# Patient Record
Sex: MALE | Race: WHITE | Employment: OTHER | URBAN - METROPOLITAN AREA
[De-identification: names, ages, dates, MRNs, and addresses within clinical notes are randomized per-mention and may not be internally consistent; named-entity substitution may affect disease eponyms.]

---

## 2019-12-13 ENCOUNTER — HOSPITAL ENCOUNTER (OUTPATIENT)
Age: 64
Discharge: HOME | End: 2019-12-15
Payer: COMMERCIAL

## 2019-12-13 ENCOUNTER — HOSPITAL ENCOUNTER (OUTPATIENT)
Dept: GENERAL RADIOLOGY | Age: 64
Discharge: HOME | End: 2019-12-15
Payer: COMMERCIAL

## 2019-12-13 DIAGNOSIS — S60.021A CONTUSION OF RIGHT INDEX FINGER WITHOUT DAMAGE TO NAIL, INITIAL ENCOUNTER: ICD-10-CM

## 2019-12-13 PROCEDURE — 73130 X-RAY EXAM OF HAND: CPT

## 2020-10-30 ENCOUNTER — HOSPITAL ENCOUNTER (OUTPATIENT)
Age: 65
Discharge: HOME | End: 2020-11-01

## 2020-10-30 PROCEDURE — 88305 TISSUE EXAM BY PATHOLOGIST: CPT

## 2020-10-30 PROCEDURE — 88342 IMHCHEM/IMCYTCHM 1ST ANTB: CPT

## 2020-11-30 ENCOUNTER — HOSPITAL ENCOUNTER (OUTPATIENT)
Age: 65
Discharge: HOME | End: 2020-12-02
Payer: COMMERCIAL

## 2020-11-30 ENCOUNTER — HOSPITAL ENCOUNTER (OUTPATIENT)
Dept: GENERAL RADIOLOGY | Age: 65
Discharge: HOME | End: 2020-12-02
Payer: COMMERCIAL

## 2020-11-30 PROCEDURE — 73630 X-RAY EXAM OF FOOT: CPT

## 2021-07-14 ENCOUNTER — HOSPITAL ENCOUNTER (OUTPATIENT)
Dept: MRI IMAGING | Age: 66
Discharge: HOME | End: 2021-07-16
Payer: COMMERCIAL

## 2021-07-14 DIAGNOSIS — S86.021A: ICD-10-CM

## 2021-07-14 DIAGNOSIS — M79.604 RIGHT LEG PAIN: ICD-10-CM

## 2021-07-14 PROCEDURE — 73721 MRI JNT OF LWR EXTRE W/O DYE: CPT

## 2022-08-18 ENCOUNTER — HOSPITAL ENCOUNTER (OUTPATIENT)
Dept: ULTRASOUND IMAGING | Age: 67
Discharge: HOME | End: 2022-08-20
Payer: MEDICARE

## 2022-08-18 ENCOUNTER — HOSPITAL ENCOUNTER (OUTPATIENT)
Age: 67
Discharge: HOME | End: 2022-08-20
Payer: MEDICARE

## 2022-08-18 DIAGNOSIS — I65.23 BILATERAL CAROTID ARTERY STENOSIS: ICD-10-CM

## 2022-08-18 PROCEDURE — 93880 EXTRACRANIAL BILAT STUDY: CPT

## 2022-09-14 ENCOUNTER — TELEPHONE (OUTPATIENT)
Dept: CARDIOLOGY CLINIC | Age: 67
End: 2022-09-14

## 2022-09-14 NOTE — TELEPHONE ENCOUNTER
Patient Appointment Form:      PCP: Andrade Pizano MD  Referring: Andrade Pizano MD    Has the Patient:    Seen a Cardiologist? no    Had a heart catheterization? no    Had heart surgery? no    Had a stress test or nuclear stress test? yes   date: Over 20yrs ago   facility name:  Can't recall    Had an echocardiogram? no    Had a vascular ultrasound? no    Had a 24/48 heart monitor or extended cardiac event monitor? no    Had recent blood work in the last 6 months? yes    date: 03/30/2022    ordering physician: Andrade Pizano MD    Had a pacemaker/ICD/ILR implant? no    Seen an Electrophysiologist? no        Will send records via: in [address] [place] [address] Rd      Date & time of appointment:09/20/2022 1045A

## 2022-09-20 ENCOUNTER — OFFICE VISIT (OUTPATIENT)
Dept: CARDIOLOGY CLINIC | Age: 67
End: 2022-09-20
Payer: MEDICARE

## 2022-09-20 ENCOUNTER — TELEPHONE (OUTPATIENT)
Dept: CARDIOLOGY CLINIC | Age: 67
End: 2022-09-20

## 2022-09-20 VITALS
RESPIRATION RATE: 12 BRPM | HEIGHT: 71 IN | DIASTOLIC BLOOD PRESSURE: 71 MMHG | BODY MASS INDEX: 30.66 KG/M2 | WEIGHT: 219 LBS | SYSTOLIC BLOOD PRESSURE: 144 MMHG | HEART RATE: 54 BPM

## 2022-09-20 DIAGNOSIS — R00.1 BRADYCARDIA: ICD-10-CM

## 2022-09-20 DIAGNOSIS — I10 HYPERTENSION, UNSPECIFIED TYPE: ICD-10-CM

## 2022-09-20 PROCEDURE — 1123F ACP DISCUSS/DSCN MKR DOCD: CPT | Performed by: INTERNAL MEDICINE

## 2022-09-20 PROCEDURE — 93000 ELECTROCARDIOGRAM COMPLETE: CPT | Performed by: INTERNAL MEDICINE

## 2022-09-20 PROCEDURE — 99204 OFFICE O/P NEW MOD 45 MIN: CPT | Performed by: INTERNAL MEDICINE

## 2022-09-20 RX ORDER — LOSARTAN POTASSIUM 100 MG/1
TABLET ORAL
COMMUNITY
Start: 2022-09-09 | End: 2022-10-10 | Stop reason: SDUPTHER

## 2022-09-20 RX ORDER — LORAZEPAM 0.5 MG/1
0.5 TABLET ORAL EVERY 6 HOURS PRN
COMMUNITY

## 2022-09-20 RX ORDER — ASPIRIN 81 MG/1
81 TABLET, CHEWABLE ORAL DAILY
COMMUNITY

## 2022-09-20 RX ORDER — TRAZODONE HYDROCHLORIDE 50 MG/1
75 TABLET ORAL
COMMUNITY
Start: 2022-07-06

## 2022-09-20 RX ORDER — CITALOPRAM 40 MG/1
TABLET ORAL
COMMUNITY
Start: 2022-08-02

## 2022-09-20 RX ORDER — AMLODIPINE BESYLATE 10 MG/1
TABLET ORAL
COMMUNITY
Start: 2022-08-18

## 2022-09-20 RX ORDER — OMEPRAZOLE 20 MG/1
20 CAPSULE, DELAYED RELEASE ORAL DAILY
COMMUNITY

## 2022-09-20 NOTE — PROGRESS NOTES
Chief Complaint   Patient presents with    Bradycardia       Patient Active Problem List    Diagnosis Date Noted    HTN (hypertension) 09/20/2022    Bradycardia 09/20/2022       Current Outpatient Medications   Medication Sig Dispense Refill    amLODIPine (NORVASC) 10 MG tablet TAKE 1 TABLET BY MOUTH ONCE DAILY      citalopram (CELEXA) 40 MG tablet TAKE 1 TABLET BY MOUTH ONCE DAILY      losartan (COZAAR) 100 MG tablet TAKE 1 TABLET BY MOUTH ONCE DAILY      traZODone (DESYREL) 50 MG tablet 75 mg      aspirin 81 MG chewable tablet Take 81 mg by mouth daily      LORazepam (ATIVAN) 0.5 MG tablet Take 0.5 mg by mouth every 6 hours as needed for Anxiety. Potassium 99 MG TABS Take by mouth      omeprazole (PRILOSEC) 20 MG delayed release capsule Take 20 mg by mouth daily       No current facility-administered medications for this visit. Allergies   Allergen Reactions    Penicillins Anaphylaxis       Vitals:    09/20/22 1046   BP: (!) 144/71   Pulse: 54   Resp: 12   Weight: 219 lb (99.3 kg)   Height: 5' 11\" (1.803 m)                 SUBJECTIVE: Young Guardado presents to the office today for consult - dr Reilly Beverly. He complains of fatigue and denies   dyspnea, exertional chest pressure/discomfort, orthopnea, palpitations, and syncope  Does golf, random walk and all outdoor chores in summer with no issues  On citalopram, trazdone and lorazepam.          Physical Exam   BP (!) 144/71   Pulse 54   Resp 12   Ht 5' 11\" (1.803 m)   Wt 219 lb (99.3 kg)   BMI 30.54 kg/m²   Constitutional: Oriented to person, place, and time. overweightNo distress. Head: Normocephalic and atraumatic. Neck: No hepatojugular reflux and no JVD present. Carotid bruit is not present. Cardiovascular: Normal rate, regular rhythm, normal heart sounds and intact distal pulses. No gallop and no friction rub. No murmur heard. Pulmonary/Chest: Effort normal and breath sounds normal. No respiratory distress. No wheezes. No rales. Abdominal: Soft. Bowel sounds are normal. No distension and no mass. No tenderness. No rebound and no guarding. Musculoskeletal: No edema and no tenderness. Neurological: Alert and oriented to person, place, and time. Skin: Skin is warm and dry. No rash noted. Psychiatric: Normal mood and affect. Behavior is normal.     EKG:  ectopic atrial rhythm, rate 54.     ASSESSMENT AND PLAN:  Patient Active Problem List   Diagnosis    HTN (hypertension)    Bradycardia     Patient with ectopic atrial bradycardia, possibly due to medications ( e.g. celexa)  Normal CV exam and ekg otherwise unremarkable  Fatigue likely due to meds, but will do ETT-R to establish presence of chronotropic competency      Liza Messer M.D  [facility]

## 2022-09-20 NOTE — TELEPHONE ENCOUNTER
Patient states that he can't walk the treadmill, he said he is willing to do a bree scan if you think it is needed

## 2022-10-06 ENCOUNTER — TELEPHONE (OUTPATIENT)
Dept: CARDIOLOGY | Age: 67
End: 2022-10-06

## 2022-10-06 NOTE — TELEPHONE ENCOUNTER
Spoke with patient and confirmed Regular stress test appointment on 10/10/22 at 1130. Instructions for test and COVID-19 preprocedure information reviewed with patient, questions answered. Patient verbalized understanding.

## 2022-10-10 ENCOUNTER — HOSPITAL ENCOUNTER (OUTPATIENT)
Dept: CARDIOLOGY | Age: 67
Discharge: HOME | End: 2022-10-10
Payer: MEDICARE

## 2022-10-10 VITALS
OXYGEN SATURATION: 98 % | SYSTOLIC BLOOD PRESSURE: 142 MMHG | DIASTOLIC BLOOD PRESSURE: 62 MMHG | WEIGHT: 214 LBS | BODY MASS INDEX: 29.96 KG/M2 | HEIGHT: 71 IN | HEART RATE: 52 BPM

## 2022-10-10 DIAGNOSIS — R00.1 BRADYCARDIA: ICD-10-CM

## 2022-10-10 DIAGNOSIS — I10 HYPERTENSION, UNSPECIFIED TYPE: ICD-10-CM

## 2022-10-10 PROCEDURE — 93017 CV STRESS TEST TRACING ONLY: CPT

## 2022-10-10 PROCEDURE — 78452 HT MUSCLE IMAGE SPECT MULT: CPT

## 2022-10-10 RX ORDER — LOSARTAN POTASSIUM 100 MG/1
TABLET ORAL
Qty: 30 TABLET | Refills: 5 | Status: SHIPPED | OUTPATIENT
Start: 2022-10-10

## 2022-10-10 NOTE — PROCEDURES
[address] [facility] [address] [phone]    Exercise Stress Study (non-nuclear)      Name: Pawel Ho Account Number:  [de-identified]      :  1955          Sex: male         Date of Study:  10/10/2022    Height: 5' 11\" (180.3 cm)          Weight: 214 lb (97.1 kg)    Ordering Provider: Hamlet Johnson MD          PCP: Solitario Lau MD    Cardiologist: Hamlet Johnson MD              Interpreting Physician: Hamlet Johnson MD    Indication:   Detecting the presence and location of coronary artery disease    Clinical History:   Patient has no known history of coronary artery disease. Exercise: The patient exercised using a Jhonathan protocol, completing 7:31 minutes and reaching an estimated work load of 42.6 metabolic equivalents (METS). Resting HR was 52. Peak exercise heart rate was 129 ( 83% of maximum predicted heart rate for age). Baseline /62. Peak exercise /118. The blood pressure response to exercise was hypertensive      Exercise was terminated due to fatigue, heart rate attained, and pt. Requested to stop due to shortness of breath difficulty  . The patient experienced no chest pain with exercise. Pulse oximetry was used to monitor oxygen saturation during the stress test.  The study was performed on Room Air. The resting pulse oximeter was 98%. The lowest O2 saturation seen during exercise was 99 %. The average O2 saturation with exercise was 98 %. Exercise ECG:   The patient demonstrated occasional PVC's during exercise. With exercise, there were no ST segment changes of significance at the heart rate achieved. Antoine treadmill score was 7.5 implying low risk. Impression:    Exercise EKG was negative. The patient experienced no chest pain with exercise. Exercise capacity was above average.     Low risk general exercise treadmill test.    Thank you for sending your patient to this [employer].     Electronically signed by Joao Lima MD on 10/10/22 at 1:37 PM EDT

## 2022-10-11 ENCOUNTER — TELEPHONE (OUTPATIENT)
Dept: CARDIOLOGY CLINIC | Age: 67
End: 2022-10-11

## 2022-10-11 NOTE — TELEPHONE ENCOUNTER
----- Message from Shayne Arndt MD sent at 10/10/2022  4:02 PM EDT -----  Stress normal  Ov prn        ----- Message -----  From: Claudean Commander  Sent: 10/10/2022   3:08 PM EDT  To: Shayne Arndt MD      ----- Message -----  From: Curly Saavedra  Sent: 10/10/2022   2:54 PM EDT  To:  Deandre Holbrook

## 2023-10-25 RX ORDER — LOSARTAN POTASSIUM 100 MG/1
TABLET ORAL
Qty: 90 TABLET | Refills: 1 | Status: SHIPPED | OUTPATIENT
Start: 2023-10-25

## 2024-01-02 RX ORDER — LOSARTAN POTASSIUM 100 MG/1
TABLET ORAL
Qty: 90 TABLET | Refills: 1 | Status: SHIPPED | OUTPATIENT
Start: 2024-01-02

## 2024-03-25 RX ORDER — LOSARTAN POTASSIUM 100 MG/1
TABLET ORAL
Qty: 100 TABLET | Refills: 2 | OUTPATIENT
Start: 2024-03-25

## 2024-04-22 RX ORDER — LOSARTAN POTASSIUM 100 MG/1
TABLET ORAL
Qty: 100 TABLET | Refills: 2 | OUTPATIENT
Start: 2024-04-22

## 2024-06-26 RX ORDER — LOSARTAN POTASSIUM 100 MG/1
TABLET ORAL
Qty: 100 TABLET | Refills: 2 | OUTPATIENT
Start: 2024-06-26

## 2024-07-08 RX ORDER — LOSARTAN POTASSIUM 100 MG/1
TABLET ORAL
Qty: 100 TABLET | Refills: 2 | OUTPATIENT
Start: 2024-07-08

## 2024-07-12 RX ORDER — LOSARTAN POTASSIUM 100 MG/1
TABLET ORAL
Qty: 100 TABLET | Refills: 2 | OUTPATIENT
Start: 2024-07-12

## 2024-10-29 RX ORDER — LOSARTAN POTASSIUM 100 MG/1
TABLET ORAL
Qty: 100 TABLET | Refills: 2 | OUTPATIENT
Start: 2024-10-29

## 2024-11-14 ENCOUNTER — HOSPITAL ENCOUNTER (OUTPATIENT)
Age: 69
Discharge: HOME | End: 2024-11-16
Payer: MEDICARE

## 2024-11-14 ENCOUNTER — HOSPITAL ENCOUNTER (OUTPATIENT)
Dept: GENERAL RADIOLOGY | Age: 69
Discharge: HOME | End: 2024-11-16
Attending: FAMILY MEDICINE
Payer: MEDICARE

## 2024-11-14 DIAGNOSIS — R10.84 ABDOMINAL PAIN, GENERALIZED: ICD-10-CM

## 2024-11-14 PROCEDURE — 74018 RADEX ABDOMEN 1 VIEW: CPT

## 2024-11-15 ENCOUNTER — OFFICE VISIT (OUTPATIENT)
Dept: SURGERY | Age: 69
End: 2024-11-15
Payer: MEDICARE

## 2024-11-15 VITALS
DIASTOLIC BLOOD PRESSURE: 75 MMHG | HEART RATE: 58 BPM | WEIGHT: 214 LBS | OXYGEN SATURATION: 98 % | SYSTOLIC BLOOD PRESSURE: 153 MMHG | RESPIRATION RATE: 18 BRPM | BODY MASS INDEX: 29.96 KG/M2 | HEIGHT: 71 IN

## 2024-11-15 DIAGNOSIS — K43.9 VENTRAL HERNIA WITHOUT OBSTRUCTION OR GANGRENE: Primary | ICD-10-CM

## 2024-11-15 PROCEDURE — 99204 OFFICE O/P NEW MOD 45 MIN: CPT | Performed by: SURGERY

## 2024-11-15 PROCEDURE — 3077F SYST BP >= 140 MM HG: CPT | Performed by: SURGERY

## 2024-11-15 PROCEDURE — 3078F DIAST BP <80 MM HG: CPT | Performed by: SURGERY

## 2024-11-15 PROCEDURE — 1123F ACP DISCUSS/DSCN MKR DOCD: CPT | Performed by: SURGERY

## 2024-11-15 PROCEDURE — 1159F MED LIST DOCD IN RCRD: CPT | Performed by: SURGERY

## 2024-11-15 NOTE — PROGRESS NOTES
Vaping Use    Vaping status: Never Used   Substance and Sexual Activity    Alcohol use: Yes       Family History   Problem Relation Age of Onset    Heart Disease Mother     Heart Disease Father        Review of Systems   All other systems reviewed and are negative.              Objective:  Vitals:    11/15/24 0930   BP: (!) 153/75   Pulse: 58   Resp: 18   SpO2: 98%   Weight: 97.1 kg (214 lb)   Height: 1.803 m (5' 11\")        Body mass index is 29.85 kg/m².      Physical Exam  Constitutional:       General: He is not in acute distress.     Appearance: He is not diaphoretic.   HENT:      Head: Normocephalic and atraumatic.   Eyes:      General:         Right eye: No discharge.         Left eye: No discharge.   Neck:      Trachea: No tracheal deviation.   Cardiovascular:      Rate and Rhythm: Normal rate.   Pulmonary:      Effort: Pulmonary effort is normal. No respiratory distress.   Abdominal:      General: There is no distension.      Palpations: Abdomen is soft.      Tenderness: There is no guarding or rebound.       Skin:     General: Skin is warm and dry.   Neurological:      Mental Status: He is alert and oriented to person, place, and time.         CBC: No results found for: \"WBC\", \"RBC\", \"HGB\", \"HCT\", \"MCV\", \"MCH\", \"MCHC\", \"RDW\", \"PLT\", \"MPV\"  CMP:  No results found for: \"NA\", \"K\", \"CL\", \"CO2\", \"BUN\", \"CREATININE\", \"GFRAA\", \"AGRATIO\", \"LABGLOM\", \"GLUCOSE\", \"GLU\", \"LABALBU\", \"CALCIUM\", \"BILITOT\", \"ALKPHOS\", \"AST\", \"ALT\"  PT/INR:  No results found for: \"PROTIME\", \"INR\"  HgBA1c:  No results found for: \"LABA1C\"  LIPASE:  No results found for: \"LIPASE\"       Cristobal Ontiveros MD    I have examined the patient and performed the key aspects of physical exam, and reviewed the record (including laboratory findings, results, and all pertinent radiology images, which are independently reviewed and interpreted unless otherwise explicitly stated).  The referring provider's notes were also reviewed.    Any procedures planned

## 2024-11-15 NOTE — H&P (VIEW-ONLY)
[facility] General Surgery Clinic Note    Assessment/Plan:      Diagnosis Orders   1. Ventral umbilical hernia without obstruction or gangrene      Will plan for repair            Return for Surgery.      Chief Complaint   Patient presents with    New Patient     Ref from Dr Cabral for abdominal pain       PCP: Amelia Cabral DO    HPI: Ross Knox is a 68 y.o. male who presents in consultation for umbilical hernia.  He has noticed it about a few years ago when he was using his Rototiller.  Lately he has had more symptoms of discomfort.  His bowels are moving.  Case discussed with Dr. Cabral.           Past Medical History:   Diagnosis Date    Hypertension        No past surgical history on file.    Prior to Admission medications    Medication Sig Start Date End Date Taking? Authorizing Provider   losartan (COZAAR) 100 MG tablet TAKE 1 TABLET BY MOUTH ONCE  DAILY 1/2/24  Yes Landon Lobo MD   amLODIPine (NORVASC) 10 MG tablet TAKE 1 TABLET BY MOUTH ONCE DAILY 8/18/22  Yes ProviderBarbara MD   citalopram (CELEXA) 40 MG tablet TAKE 1 TABLET BY MOUTH ONCE DAILY 8/2/22  Yes ProviderBarbara MD   traZODone (DESYREL) 50 MG tablet 1.5 tablets 7/6/22  Yes ProviderBarbara MD   aspirin 81 MG chewable tablet Take 1 tablet by mouth daily   Yes ProviderBarbara MD   LORazepam (ATIVAN) 0.5 MG tablet Take 1 tablet by mouth every 6 hours as needed for Anxiety.   Yes ProviderBarbara MD   Potassium 99 MG TABS Take by mouth   Yes Barbara Bradshaw MD   omeprazole (PRILOSEC) 20 MG delayed release capsule Take 1 capsule by mouth daily   Yes Barbara Bradshaw MD       Allergies   Allergen Reactions    Penicillins Anaphylaxis       Social History     Socioeconomic History    Marital status:      Spouse name: None    Number of children: None    Years of education: None    Highest education level: None   Tobacco Use    Smoking status: Former     Types: Cigarettes    Smokeless tobacco: Never

## 2024-11-27 ENCOUNTER — PREP FOR PROCEDURE (OUTPATIENT)
Dept: SURGERY | Age: 69
End: 2024-11-27

## 2024-11-27 ENCOUNTER — TELEPHONE (OUTPATIENT)
Dept: SURGERY | Age: 69
End: 2024-11-27

## 2024-11-27 PROBLEM — K43.9 HERNIA, VENTRAL: Status: ACTIVE | Noted: 2024-11-27

## 2024-11-27 NOTE — TELEPHONE ENCOUNTER
Prior Authorization Form:      DEMOGRAPHICS:                     Patient Name:  Stacie Knox  Patient :  1955            Insurance:  Payor: [facility] MEDICARE / Plan: [facility] MEDICARE ADVANTAGE / Product Type: *No Product type* /   Insurance ID Number:    Payer/Plan Subscr  Sex Relation Sub. Ins. ID Effective Group Num   1. [employer] MEDICARE * STACIE KNOX 1955 Male Self [id number]                                   [address] [address]         DIAGNOSIS & PROCEDURE:                       Procedure/Operation: Open ventral hernia repair with mesh           CPT Code: 88836    Diagnosis:  Ventral hernia    ICD10 Code: K43.9    [facility]  [facility]    Surgeon:  Dr Ontiveros    SCHEDULING INFORMATION:                          Date: 24    Time: 7:00 am              Anesthesia:  General                                                       Status:  Outpatient        Special Comments:         Electronically signed by Krysten Jim MA on 2024 at 3:49 PM

## 2024-11-27 NOTE — TELEPHONE ENCOUNTER
Ross Knox is scheduled for open ventral hernia repair with mesh with Dr Ontiveros on 12-09-24 at [facility]. Patient needs to be NPO after midnight the night before procedure. All surgery instructions were explained to the patient and a surgery letter was also mailed out. MA informed patient that PAT will also be calling to review pre-op instructions and medications. Patient verbalized understanding.  Electronically signed by Krysten Jim MA on 11/27/2024 at 3:48 PM

## 2024-12-04 VITALS — BODY MASS INDEX: 29.12 KG/M2 | HEIGHT: 71 IN | WEIGHT: 208 LBS

## 2024-12-04 RX ORDER — MULTIVITAMIN WITH IRON
1 TABLET ORAL DAILY
COMMUNITY

## 2024-12-04 NOTE — PROGRESS NOTES
[facility] PRE-ADMISSION TESTING INSTRUCTIONS    The Preadmission Testing patient is instructed accordingly using the following criteria (check applicable):    ARRIVAL INSTRUCTIONS:  [x] Parking the day of Surgery is located in the Main Entrance lot.  Upon entering the door, make an immediate right to the surgery reception desk    [x] Bring photo ID and insurance card    [x] Please be sure to arrange for responsible adult to provide transportation to and from the hospital    [x] Please arrange for responsible adult to be with you for the 24 hour period post procedure due to having anesthesia    [x] If you awake am of surgery not feeling well or have temperature >100 please call [phone]    GENERAL INSTRUCTIONS:  Nothing to eat or drink after midnight    No gum, candy or mints.    [x] You may brush your teeth, but do not swallow any water    [x] Take medications as instructed with 1-2 oz of water    [x] Stop herbal supplements and vitamins 5 days prior to procedure    [x] Shower or bath with soap, lather and rinse well, AM of Surgery, no lotion, powders or creams     [x] No tobacco products within 24 hours of surgery     [x] No alcohol or illegal drug use within 24 hours of surgery.    [x] Jewelry, body piercing's, eyeglasses, contact lenses and dentures are not permitted into surgery (bring cases)     [x] You can expect a call the business day prior to procedure to notify you if your arrival time changes    [x] If you receive a survey after surgery we would greatly appreciate your comments    [x] Please notify surgeon if you develop any illness between now and time of surgery (cold, cough, sore throat, fever, nausea, vomiting) or any signs of infections  including skin, wounds, and dental.    [x]  The Outpatient Pharmacy is available to fill your prescription here on your day of surgery, ask your preop nurse for details    [x] Other instructions: wear loose, comfortable

## 2024-12-05 ENCOUNTER — HOSPITAL ENCOUNTER (OUTPATIENT)
Dept: PREADMISSION TESTING | Age: 69
Discharge: HOME | End: 2024-12-05
Payer: MEDICARE

## 2024-12-05 LAB
EKG ATRIAL RATE: 40 BPM
EKG P AXIS: 31 DEGREES
EKG P-R INTERVAL: 186 MS
EKG Q-T INTERVAL: 474 MS
EKG QRS DURATION: 96 MS
EKG QTC CALCULATION (BAZETT): 386 MS
EKG R AXIS: 57 DEGREES
EKG T AXIS: 43 DEGREES
EKG VENTRICULAR RATE: 40 BPM

## 2024-12-05 PROCEDURE — 87081 CULTURE SCREEN ONLY: CPT

## 2024-12-05 NOTE — PROGRESS NOTES
Pt's EKG done today reveals heart rate of 40. Pulse ox shows heart rate 40-45. Reviewed this with Dr. Rocha, anesthesiologist, wants EKG faxed to Dr. Cabral office and if pt is ok to proceed with surgery. EKG faxed to Dr. Cabral' office

## 2024-12-06 NOTE — PROGRESS NOTES
Pt scheduled for OR 12/9/24.  Spoke with Lena at Dr. Cabral office who states Dr. Cabral reviewed EKG done in PAT 12/5/24, states pt is OK to proceed with OR as scheduled.  Office to send note to PAT.  
Alert and oriented to person, place and time, memory intact, behavior appropriate to situation, PERRL.

## 2024-12-07 ENCOUNTER — ANESTHESIA EVENT (OUTPATIENT)
Dept: OPERATING ROOM | Age: 69
End: 2024-12-07
Payer: MEDICARE

## 2024-12-07 LAB
MICROORGANISM SPEC CULT: NORMAL
SPECIMEN DESCRIPTION: NORMAL

## 2024-12-09 ENCOUNTER — ANESTHESIA (OUTPATIENT)
Dept: OPERATING ROOM | Age: 69
End: 2024-12-09
Payer: MEDICARE

## 2024-12-09 ENCOUNTER — HOSPITAL ENCOUNTER (OUTPATIENT)
Age: 69
Setting detail: OUTPATIENT SURGERY
Discharge: HOME | End: 2024-12-09
Attending: SURGERY | Admitting: SURGERY
Payer: MEDICARE

## 2024-12-09 VITALS
WEIGHT: 208 LBS | BODY MASS INDEX: 29.12 KG/M2 | RESPIRATION RATE: 18 BRPM | DIASTOLIC BLOOD PRESSURE: 70 MMHG | HEIGHT: 71 IN | TEMPERATURE: 97.4 F | SYSTOLIC BLOOD PRESSURE: 137 MMHG | OXYGEN SATURATION: 98 % | HEART RATE: 51 BPM

## 2024-12-09 DIAGNOSIS — K43.9 VENTRAL HERNIA WITHOUT OBSTRUCTION OR GANGRENE: Primary | ICD-10-CM

## 2024-12-09 LAB
ERYTHROCYTE [DISTWIDTH] IN BLOOD BY AUTOMATED COUNT: 13.1 % (ref 11.5–15)
HCT VFR BLD AUTO: 43 % (ref 37–54)
HGB BLD-MCNC: 14.4 G/DL (ref 12.5–16.5)
MCH RBC QN AUTO: 30.6 PG (ref 26–35)
MCHC RBC AUTO-ENTMCNC: 33.5 G/DL (ref 32–34.5)
MCV RBC AUTO: 91.3 FL (ref 80–99.9)
PLATELET # BLD AUTO: 190 K/UL (ref 130–450)
PMV BLD AUTO: 11.6 FL (ref 7–12)
RBC # BLD AUTO: 4.71 M/UL (ref 3.8–5.8)
WBC OTHER # BLD: 8.7 K/UL (ref 4.5–11.5)

## 2024-12-09 PROCEDURE — 2709999900 HC NON-CHARGEABLE SUPPLY: Performed by: SURGERY

## 2024-12-09 PROCEDURE — 6360000002 HC RX W HCPCS

## 2024-12-09 PROCEDURE — 3700000000 HC ANESTHESIA ATTENDED CARE: Performed by: SURGERY

## 2024-12-09 PROCEDURE — 7100000001 HC PACU RECOVERY - ADDTL 15 MIN: Performed by: SURGERY

## 2024-12-09 PROCEDURE — 2500000003 HC RX 250 WO HCPCS

## 2024-12-09 PROCEDURE — 3600000012 HC SURGERY LEVEL 2 ADDTL 15MIN: Performed by: SURGERY

## 2024-12-09 PROCEDURE — 2500000003 HC RX 250 WO HCPCS: Performed by: SURGERY

## 2024-12-09 PROCEDURE — 7100000011 HC PHASE II RECOVERY - ADDTL 15 MIN: Performed by: SURGERY

## 2024-12-09 PROCEDURE — 3700000001 HC ADD 15 MINUTES (ANESTHESIA): Performed by: SURGERY

## 2024-12-09 PROCEDURE — 85027 COMPLETE CBC AUTOMATED: CPT

## 2024-12-09 PROCEDURE — 6360000002 HC RX W HCPCS: Performed by: SURGERY

## 2024-12-09 PROCEDURE — 6360000002 HC RX W HCPCS: Performed by: ANESTHESIOLOGY

## 2024-12-09 PROCEDURE — 7100000010 HC PHASE II RECOVERY - FIRST 15 MIN: Performed by: SURGERY

## 2024-12-09 PROCEDURE — 3600000002 HC SURGERY LEVEL 2 BASE: Performed by: SURGERY

## 2024-12-09 PROCEDURE — 2580000003 HC RX 258: Performed by: SURGERY

## 2024-12-09 PROCEDURE — 2580000003 HC RX 258: Performed by: NURSE ANESTHETIST, CERTIFIED REGISTERED

## 2024-12-09 PROCEDURE — 49592 RPR AA HRN 1ST < 3 NCR/STRN: CPT | Performed by: SURGERY

## 2024-12-09 PROCEDURE — 7100000000 HC PACU RECOVERY - FIRST 15 MIN: Performed by: SURGERY

## 2024-12-09 PROCEDURE — 6370000000 HC RX 637 (ALT 250 FOR IP): Performed by: ANESTHESIOLOGY

## 2024-12-09 RX ORDER — DOCUSATE SODIUM 100 MG/1
100 CAPSULE, LIQUID FILLED ORAL 2 TIMES DAILY
Qty: 50 CAPSULE | Refills: 0 | Status: SHIPPED | OUTPATIENT
Start: 2024-12-09

## 2024-12-09 RX ORDER — OXYCODONE HYDROCHLORIDE 5 MG/1
5 TABLET ORAL PRN
Status: COMPLETED | OUTPATIENT
Start: 2024-12-09 | End: 2024-12-09

## 2024-12-09 RX ORDER — SODIUM CHLORIDE 9 MG/ML
INJECTION, SOLUTION INTRAVENOUS PRN
Status: DISCONTINUED | OUTPATIENT
Start: 2024-12-09 | End: 2024-12-09 | Stop reason: HOSPADM

## 2024-12-09 RX ORDER — DIPHENHYDRAMINE HYDROCHLORIDE 50 MG/ML
12.5 INJECTION INTRAMUSCULAR; INTRAVENOUS
Status: DISCONTINUED | OUTPATIENT
Start: 2024-12-09 | End: 2024-12-09 | Stop reason: HOSPADM

## 2024-12-09 RX ORDER — MEPERIDINE HYDROCHLORIDE 25 MG/ML
12.5 INJECTION INTRAMUSCULAR; INTRAVENOUS; SUBCUTANEOUS EVERY 5 MIN PRN
Status: DISCONTINUED | OUTPATIENT
Start: 2024-12-09 | End: 2024-12-09 | Stop reason: HOSPADM

## 2024-12-09 RX ORDER — SODIUM CHLORIDE 0.9 % (FLUSH) 0.9 %
5-40 SYRINGE (ML) INJECTION PRN
Status: DISCONTINUED | OUTPATIENT
Start: 2024-12-09 | End: 2024-12-09 | Stop reason: HOSPADM

## 2024-12-09 RX ORDER — SODIUM CHLORIDE 0.9 % (FLUSH) 0.9 %
5-40 SYRINGE (ML) INJECTION EVERY 12 HOURS SCHEDULED
Status: DISCONTINUED | OUTPATIENT
Start: 2024-12-09 | End: 2024-12-09 | Stop reason: HOSPADM

## 2024-12-09 RX ORDER — ROCURONIUM BROMIDE 10 MG/ML
INJECTION, SOLUTION INTRAVENOUS
Status: DISCONTINUED | OUTPATIENT
Start: 2024-12-09 | End: 2024-12-09 | Stop reason: SDUPTHER

## 2024-12-09 RX ORDER — KETOROLAC TROMETHAMINE 30 MG/ML
INJECTION, SOLUTION INTRAMUSCULAR; INTRAVENOUS
Status: DISCONTINUED | OUTPATIENT
Start: 2024-12-09 | End: 2024-12-09 | Stop reason: SDUPTHER

## 2024-12-09 RX ORDER — MIDAZOLAM HYDROCHLORIDE 1 MG/ML
INJECTION, SOLUTION INTRAMUSCULAR; INTRAVENOUS
Status: DISCONTINUED | OUTPATIENT
Start: 2024-12-09 | End: 2024-12-09 | Stop reason: SDUPTHER

## 2024-12-09 RX ORDER — SODIUM CHLORIDE 9 MG/ML
INJECTION, SOLUTION INTRAVENOUS
Status: DISCONTINUED | OUTPATIENT
Start: 2024-12-09 | End: 2024-12-09 | Stop reason: SDUPTHER

## 2024-12-09 RX ORDER — PROPOFOL 10 MG/ML
INJECTION, EMULSION INTRAVENOUS
Status: DISCONTINUED | OUTPATIENT
Start: 2024-12-09 | End: 2024-12-09 | Stop reason: SDUPTHER

## 2024-12-09 RX ORDER — HYDROCODONE BITARTRATE AND ACETAMINOPHEN 5; 325 MG/1; MG/1
1 TABLET ORAL EVERY 6 HOURS PRN
Qty: 28 TABLET | Refills: 0 | Status: SHIPPED | OUTPATIENT
Start: 2024-12-09 | End: 2024-12-16

## 2024-12-09 RX ORDER — KETAMINE HYDROCHLORIDE 10 MG/ML
INJECTION, SOLUTION INTRAMUSCULAR; INTRAVENOUS
Status: DISCONTINUED | OUTPATIENT
Start: 2024-12-09 | End: 2024-12-09 | Stop reason: SDUPTHER

## 2024-12-09 RX ORDER — LIDOCAINE HYDROCHLORIDE 20 MG/ML
INJECTION, SOLUTION EPIDURAL; INFILTRATION; INTRACAUDAL; PERINEURAL
Status: DISCONTINUED | OUTPATIENT
Start: 2024-12-09 | End: 2024-12-09 | Stop reason: SDUPTHER

## 2024-12-09 RX ORDER — LIDOCAINE HYDROCHLORIDE ANHYDROUS AND DEXTROSE MONOHYDRATE 5; 400 G/100ML; MG/100ML
2 INJECTION, SOLUTION INTRAVENOUS ONCE
Status: COMPLETED | OUTPATIENT
Start: 2024-12-09 | End: 2024-12-09

## 2024-12-09 RX ORDER — NALOXONE HYDROCHLORIDE 0.4 MG/ML
INJECTION, SOLUTION INTRAMUSCULAR; INTRAVENOUS; SUBCUTANEOUS PRN
Status: DISCONTINUED | OUTPATIENT
Start: 2024-12-09 | End: 2024-12-09 | Stop reason: HOSPADM

## 2024-12-09 RX ORDER — BUPIVACAINE HYDROCHLORIDE AND EPINEPHRINE 2.5; 5 MG/ML; UG/ML
INJECTION, SOLUTION EPIDURAL; INFILTRATION; INTRACAUDAL; PERINEURAL PRN
Status: DISCONTINUED | OUTPATIENT
Start: 2024-12-09 | End: 2024-12-09 | Stop reason: ALTCHOICE

## 2024-12-09 RX ORDER — OXYCODONE HYDROCHLORIDE 5 MG/1
10 TABLET ORAL PRN
Status: COMPLETED | OUTPATIENT
Start: 2024-12-09 | End: 2024-12-09

## 2024-12-09 RX ORDER — ONDANSETRON 4 MG/1
4 TABLET, FILM COATED ORAL DAILY PRN
Qty: 12 TABLET | Refills: 0 | Status: SHIPPED | OUTPATIENT
Start: 2024-12-09 | End: 2024-12-21

## 2024-12-09 RX ORDER — FENTANYL CITRATE 50 UG/ML
INJECTION, SOLUTION INTRAMUSCULAR; INTRAVENOUS
Status: DISCONTINUED | OUTPATIENT
Start: 2024-12-09 | End: 2024-12-09 | Stop reason: SDUPTHER

## 2024-12-09 RX ORDER — DIPHENHYDRAMINE HYDROCHLORIDE 50 MG/ML
INJECTION INTRAMUSCULAR; INTRAVENOUS
Status: DISCONTINUED | OUTPATIENT
Start: 2024-12-09 | End: 2024-12-09 | Stop reason: SDUPTHER

## 2024-12-09 RX ADMIN — SODIUM CHLORIDE: 9 INJECTION, SOLUTION INTRAVENOUS at 06:45

## 2024-12-09 RX ADMIN — DIPHENHYDRAMINE HYDROCHLORIDE 25 MG: 50 INJECTION, SOLUTION INTRAMUSCULAR; INTRAVENOUS at 06:56

## 2024-12-09 RX ADMIN — LIDOCAINE HYDROCHLORIDE 100 MG: 20 INJECTION, SOLUTION EPIDURAL; INFILTRATION; INTRACAUDAL; PERINEURAL at 07:03

## 2024-12-09 RX ADMIN — WATER 2000 MG: 1 INJECTION INTRAMUSCULAR; INTRAVENOUS; SUBCUTANEOUS at 07:02

## 2024-12-09 RX ADMIN — DIPHENHYDRAMINE HYDROCHLORIDE 25 MG: 50 INJECTION, SOLUTION INTRAMUSCULAR; INTRAVENOUS at 07:02

## 2024-12-09 RX ADMIN — KETAMINE HYDROCHLORIDE 30 MG: 10 INJECTION INTRAMUSCULAR; INTRAVENOUS at 07:03

## 2024-12-09 RX ADMIN — SUGAMMADEX 200 MG: 100 INJECTION, SOLUTION INTRAVENOUS at 07:43

## 2024-12-09 RX ADMIN — HYDROMORPHONE HYDROCHLORIDE 0.5 MG: 1 INJECTION, SOLUTION INTRAMUSCULAR; INTRAVENOUS; SUBCUTANEOUS at 08:11

## 2024-12-09 RX ADMIN — ROCURONIUM BROMIDE 40 MG: 10 INJECTION, SOLUTION INTRAVENOUS at 07:03

## 2024-12-09 RX ADMIN — OXYCODONE 5 MG: 5 TABLET ORAL at 09:02

## 2024-12-09 RX ADMIN — SODIUM CHLORIDE: 9 INJECTION, SOLUTION INTRAVENOUS at 06:12

## 2024-12-09 RX ADMIN — PROPOFOL 50 MG: 10 INJECTION, EMULSION INTRAVENOUS at 07:22

## 2024-12-09 RX ADMIN — KETOROLAC TROMETHAMINE 15 MG: 30 INJECTION, SOLUTION INTRAMUSCULAR; INTRAVENOUS at 07:37

## 2024-12-09 RX ADMIN — FENTANYL CITRATE 100 MCG: 50 INJECTION, SOLUTION INTRAMUSCULAR; INTRAVENOUS at 07:03

## 2024-12-09 RX ADMIN — FENTANYL CITRATE 50 MCG: 50 INJECTION, SOLUTION INTRAMUSCULAR; INTRAVENOUS at 07:22

## 2024-12-09 RX ADMIN — PROPOFOL 150 MG: 10 INJECTION, EMULSION INTRAVENOUS at 07:03

## 2024-12-09 RX ADMIN — Medication 0.5 MG: at 08:11

## 2024-12-09 RX ADMIN — LIDOCAINE HYDROCHLORIDE 2 MG/KG/HR: 4 INJECTION, SOLUTION INTRAVENOUS at 07:09

## 2024-12-09 RX ADMIN — MIDAZOLAM 2 MG: 1 INJECTION INTRAMUSCULAR; INTRAVENOUS at 07:02

## 2024-12-09 ASSESSMENT — PAIN DESCRIPTION - DESCRIPTORS
DESCRIPTORS: ACHING;DISCOMFORT
DESCRIPTORS: ACHING;DISCOMFORT;CRAMPING;SORE;TENDER
DESCRIPTORS: ACHING;DISCOMFORT
DESCRIPTORS: DISCOMFORT
DESCRIPTORS: ACHING;DISCOMFORT;SORE;TENDER

## 2024-12-09 ASSESSMENT — PAIN DESCRIPTION - LOCATION
LOCATION: ABDOMEN

## 2024-12-09 ASSESSMENT — PAIN DESCRIPTION - PAIN TYPE
TYPE: SURGICAL PAIN

## 2024-12-09 ASSESSMENT — PAIN DESCRIPTION - ORIENTATION
ORIENTATION: INNER;MID

## 2024-12-09 ASSESSMENT — PAIN SCALES - GENERAL
PAINLEVEL_OUTOF10: 6
PAINLEVEL_OUTOF10: 8

## 2024-12-09 ASSESSMENT — PAIN - FUNCTIONAL ASSESSMENT
PAIN_FUNCTIONAL_ASSESSMENT: 0-10

## 2024-12-09 NOTE — INTERVAL H&P NOTE
Update History & Physical    The patient's History and Physical of November 15, 2024 was reviewed with the patient and I examined the patient. There was no change. The surgical site was confirmed by the patient and me.     Plan: The risks, benefits, expected outcome, and alternative to the recommended procedure have been discussed with the patient. Patient understands and wants to proceed with the procedure.     Electronically signed by Calista Foster MD on 12/9/2024 at 6:48 AM

## 2024-12-09 NOTE — ANESTHESIA PRE PROCEDURE
Department of Anesthesiology  Preprocedure Note       Name:  Ross Knox   Age:  68 y.o.  :  1955                                          MRN:  [id number]         Date:  2024      Surgeon: Surgeon(s):  Cristobal Ontiveros MD    Procedure: Procedure(s):  OPEN VENTRAL HERNIA REPAIR WITH MESH    Medications prior to admission:   Prior to Admission medications    Medication Sig Start Date End Date Taking? Authorizing Provider   Multiple Vitamin (MULTIVITAMIN) TABS tablet Take 1 tablet by mouth daily   Yes Barbara Bradshaw MD   losartan (COZAAR) 100 MG tablet TAKE 1 TABLET BY MOUTH ONCE  DAILY  Patient taking differently: Take 1 tablet by mouth at bedtime TAKE 1 TABLET BY MOUTH ONCE DAILY  PT REPORTS TAKING NIGHTLY 24  Yes Landon Lobo MD   amLODIPine (NORVASC) 10 MG tablet Take 1 tablet by mouth at bedtime 22  Yes Barbara Bradshaw MD   citalopram (CELEXA) 40 MG tablet Take 1 tablet by mouth at bedtime 22  Yes Barbara Bradshaw MD   traZODone (DESYREL) 50 MG tablet 1.5 tablets 22  Yes Barbara Bradshaw MD   aspirin 81 MG chewable tablet Take 1 tablet by mouth daily   Yes Barbara Bradshaw MD   LORazepam (ATIVAN) 0.5 MG tablet Take 1 tablet by mouth every 6 hours as needed for Anxiety.   Yes Barbara Bradshaw MD   omeprazole (PRILOSEC) 20 MG delayed release capsule Take 1 capsule by mouth daily PT REPORTS TAKING 10MG bid   Yes Barbara Bradshaw MD       Current medications:    Current Facility-Administered Medications   Medication Dose Route Frequency Provider Last Rate Last Admin    ceFAZolin (ANCEF) 2,000 mg in sterile water 20 mL IV syringe  2,000 mg IntraVENous On Call to OR Cristobal Ontiveros MD        lidocaine 2000 mg in dextrose 5% 500 mL infusion  2 mg/kg/hr (Adjusted) IntraVENous Once Ronald Miller MD           Allergies:    Allergies   Allergen Reactions    Penicillins Anaphylaxis       Problem List:    Patient Active Problem List   Diagnosis Code

## 2024-12-09 NOTE — OP NOTE
Operative Note    Ross Knox  YOB: 1955  [id number]    Pre-operative Diagnosis: Hernia, ventral [K43.9]    Post-operative Diagnosis: Hernia, ventral [K43.9]    Procedure(s):  OPEN PRIMARY UMBILICAL HERNIA REPAIR    * No implants in log *      Surgeon:   Primary: Cristobal Ontiveros MD    Staff:  Scrub Person First: Drake Simms LPN    Anesthesia:   General    Estimated Blood Loss: less than 50     Complications: None    Specimens: * No specimens in log *    Findings: 1cm chronically fat incarcerated umbilical hernia      Indications: Patient is a 68 y.o. male who was diagnosed with the above. Risks/Benefits/Alternatives were discussed with the patient, including bleeding, infection, iatrogenic injury to surrounding structures, possible risk of recurrence, possible mesh complications if used. The patient agreed to undergo the procedure and informed consent was obtained.    Procedure: After informed consent, the patient was brought to the operating room and placed supine.  General anesthesia was then induced which the patient tolerated well. Time out was performed to identify the correct patient and procedure. They received appropriate perioperative antibiotics. The abdomen was prepped and draped in the usual sterile fashion. Supraumbilical curvilinear incision was made.  Cautery and blunt dissection was used to carry down to the umbilical stalk.  The umbilical stalk was then isolated with blunt dissection.  We then sharply divided at the base of the fascia with scalpel.  Hernia defect was identified.  It measured approximately 1 cm and contained chronically incarcerated preperitoneal fat.  We bluntly cleared off the surrounding fascia.  Given the size it was elected to repair this primarily.  Hemostasis was ensured with cautery and then 0 Ethibond sutures were placed to repair the defect in a vest overpants fashion.  Once the hernia defect was closed the wound was then irrigated.  We then tacked

## 2024-12-09 NOTE — DISCHARGE INSTRUCTIONS
Home Care   Keep the incision area clean and dry   You may remove the dressing and cotton ball tomorrow, 12/10  Okay to take a shower starting tomorrow, 12/10  There is skin glue over the incision that is waterproof, this will fall off on it's own in 7-10 days  Place ice on incisions to decrease the pain and bruising.   Physical Activity   Walk frequently to prevent blood clots in the legs, but do not do anything that causes pain in the incisions.   Breath deeply every hour to prevent pneumonia.   No heavy lifting over 10lbs for 4-6wks.  Work   Okay to return to work with light duty next week when your pain is controlled   Avoid Heavy lifting while at work   Await follow-up appointment in 2 weeks for full work clearance   Medications   Restart blood thinners in 24 hours if no sign of bleeding   Take Ibuprofen for moderate pain. Use the Oxycodone for more severe pain.   No driving while taking prescription pain medication   Follow-up   Schedule a follow-up appointment with Dr. Ontiveros in 2 weeks.  Call Your Doctor If Any of the Following Occurs   Signs of infection, including fever and chills   Redness, swelling, increasing pain, excessive bleeding, or discharge at the incision site   Cough, shortness of breath, chest pain   Increased abdominal pain   Nausea and/or vomiting that does not resolve off narcotics.   Pain, burning, urgency or frequency of urination, or blood in the urine   Pain and/or swelling in your feet, calves, or legs   Dark urine, light stools, or evidence of jaundice (yellowing of the skin or eyes).   In case of an emergency, CALL 911 immediately.

## 2024-12-09 NOTE — ANESTHESIA POSTPROCEDURE EVALUATION
Department of Anesthesiology  Postprocedure Note    Patient: Ross Knox  MRN: [id number]  YOB: 1955  Date of evaluation: 12/9/2024    Procedure Summary       Date: 12/09/24 Room / Location: [address]    Anesthesia Start: 0650 Anesthesia Stop:     Procedure: OPEN PRIMARY UMBILICAL HERNIA REPAIR (Abdomen) Diagnosis:       Hernia, ventral      (Hernia, ventral [K43.9])    Surgeons: Cristobal Ontiveros MD Responsible Provider: Ronald Miller MD    Anesthesia Type: general ASA Status: 3            Anesthesia Type: No value filed.    Luzmaria Phase I: Luzmaria Score: 10    Luzmaria Phase II:      Anesthesia Post Evaluation    Patient location during evaluation: PACU  Patient participation: complete - patient participated  Level of consciousness: awake  Airway patency: patent  Nausea & Vomiting: no nausea and no vomiting  Cardiovascular status: hemodynamically stable  Respiratory status: acceptable  Hydration status: euvolemic  Pain management: adequate        No notable events documented.

## 2024-12-24 ENCOUNTER — OFFICE VISIT (OUTPATIENT)
Dept: SURGERY | Age: 69
End: 2024-12-24
Payer: MEDICARE

## 2024-12-24 VITALS
WEIGHT: 215 LBS | HEIGHT: 71 IN | OXYGEN SATURATION: 95 % | DIASTOLIC BLOOD PRESSURE: 79 MMHG | BODY MASS INDEX: 30.1 KG/M2 | RESPIRATION RATE: 18 BRPM | HEART RATE: 58 BPM | TEMPERATURE: 98.7 F | SYSTOLIC BLOOD PRESSURE: 140 MMHG

## 2024-12-24 DIAGNOSIS — Z87.19 S/P REPAIR OF VENTRAL HERNIA: Primary | ICD-10-CM

## 2024-12-24 DIAGNOSIS — Z98.890 S/P REPAIR OF VENTRAL HERNIA: Primary | ICD-10-CM

## 2024-12-24 PROCEDURE — 1123F ACP DISCUSS/DSCN MKR DOCD: CPT | Performed by: SURGERY

## 2024-12-24 PROCEDURE — 99213 OFFICE O/P EST LOW 20 MIN: CPT | Performed by: SURGERY

## 2024-12-24 PROCEDURE — 1159F MED LIST DOCD IN RCRD: CPT | Performed by: SURGERY

## 2024-12-24 PROCEDURE — 3078F DIAST BP <80 MM HG: CPT | Performed by: SURGERY

## 2024-12-24 PROCEDURE — 3077F SYST BP >= 140 MM HG: CPT | Performed by: SURGERY

## 2024-12-26 NOTE — PROGRESS NOTES
[facility] Surgery Clinic Note    Assessment & Plan  1. Postoperative status.  The patient's umbilical region is demonstrating satisfactory healing progress, with a minor scab present that is expected to naturally exfoliate. He reports no significant discomfort when bending over and has experienced only minor pulling sensations during light activities. He is advised to continue with the current activity restrictions for an additional month, limiting lifting to 15 pounds for a total of 6 weeks. He is informed that mild pulling or tugging sensations may occur as he increases activity, which is normal and should improve over time. OTC medication can be utilized as needed.    2. Health maintenance.  The patient had a colonoscopy in 2018, which was normal with no polyps detected. He is scheduled for a follow-up colonoscopy next year, as it is recommended to perform the procedure every 7-10 years.    PROCEDURE  The patient underwent a surgical procedure involving the repair of a small hernia with stitches.    Return if symptoms worsen or fail to improve.    There are no diagnoses linked to this encounter.      Chief Complaint   Patient presents with    Follow-up     post op OPEN VENTRAL HERNIA REPAIR WITH MESH, 12/9/24           PCP: Amelia Cabral DO  CC: No ref. provider found     Ross Knox is a 68 y.o. male .    History of Present Illness  The patient presents for a postoperative follow-up.    He reports a satisfactory recovery following his surgical procedure, with no discomfort experienced during bending movements. He has been adhering to the prescribed activity restrictions for the past 2 weeks. He has noticed a slight pulling sensation in the area of his recent surgery while engaging in light activities such as moving flower pots. His bowel movements have returned to normal approximately 2 days post-surgery. He expresses relief at the absence of persistent pressure in the surgical area.    His last

## 2025-01-03 ENCOUNTER — PREP FOR PROCEDURE (OUTPATIENT)
Dept: SURGERY | Age: 70
End: 2025-01-03

## 2025-01-03 ENCOUNTER — TELEPHONE (OUTPATIENT)
Dept: SURGERY | Age: 70
End: 2025-01-03

## 2025-01-03 DIAGNOSIS — Z12.11 COLON CANCER SCREENING: ICD-10-CM

## 2025-01-03 NOTE — TELEPHONE ENCOUNTER
Ross Knox is scheduled for colonoscopy with Dr Ontiveros on 05-15-25 at [facility]. Patient needs to be NPO after midnight the night before procedure. All surgery instructions were explained to the patient and a surgery letter was also mailed out. MA informed patient that PAT will also be calling to review pre-op instructions and medications. Patient verbalized understanding.  Electronically signed by Krysten Jim MA on 1/3/2025 at 6:42 AM

## 2025-02-02 PROBLEM — Z12.11 COLON CANCER SCREENING: Status: RESOLVED | Noted: 2025-01-03 | Resolved: 2025-02-02

## 2025-05-13 ENCOUNTER — TELEPHONE (OUTPATIENT)
Dept: SURGERY | Age: 70
End: 2025-05-13

## 2025-05-13 NOTE — TELEPHONE ENCOUNTER
Patient called and cancelled his colonoscopy on 05-15-25 due to family issues.  Patient will call back to reschedule.  Electronically signed by Krysten Jim MA on 5/13/2025 at 9:44 AM

## (undated) DEVICE — GLOVE,SURG,SIGNATURE LTX MICR,LTX,PF,7.0: Brand: MEDLINE

## (undated) DEVICE — ELECTRODE PT RET AD L9FT HI MOIST COND ADH HYDRGEL CORDED

## (undated) DEVICE — Device

## (undated) DEVICE — APPLICATOR MEDICATED 26 CC SOLUTION HI LT ORNG CHLORAPREP

## (undated) DEVICE — 4-PORT MANIFOLD: Brand: NEPTUNE 2

## (undated) DEVICE — LIQUIBAND RAPID ADHESIVE 36/CS 0.8ML: Brand: MEDLINE

## (undated) DEVICE — 3M™ TEGADERM™ TRANSPARENT FILM DRESSING FRAME STYLE, 1626W, 4 IN X 4-3/4 IN (10 CM X 12 CM), 50/CT 4CT/CASE: Brand: 3M™ TEGADERM™

## (undated) DEVICE — DOUBLE BASIN SET: Brand: MEDLINE INDUSTRIES, INC.

## (undated) DEVICE — DRAPE,CHEST,FENES,15X10,STERIL: Brand: MEDLINE

## (undated) DEVICE — SPONGE,LAP,4"X18",XR,ST,5/PK,40PK/CS: Brand: MEDLINE INDUSTRIES, INC.

## (undated) DEVICE — TOWEL,OR,DSP,ST,BLUE,STD,6/PK,12PK/CS: Brand: MEDLINE

## (undated) DEVICE — NEEDLE HYPO 25GA L1.5IN BLU POLYPR HUB S STL REG BVL STR